# Patient Record
Sex: MALE | Race: WHITE | NOT HISPANIC OR LATINO | Employment: STUDENT | URBAN - METROPOLITAN AREA
[De-identification: names, ages, dates, MRNs, and addresses within clinical notes are randomized per-mention and may not be internally consistent; named-entity substitution may affect disease eponyms.]

---

## 2019-10-10 PROBLEM — K60.2 ANAL FISSURE: Status: ACTIVE | Noted: 2019-10-10

## 2020-02-01 ENCOUNTER — OFFICE VISIT (OUTPATIENT)
Dept: URGENT CARE | Age: 25
End: 2020-02-01
Payer: COMMERCIAL

## 2020-02-01 VITALS
TEMPERATURE: 98 F | SYSTOLIC BLOOD PRESSURE: 108 MMHG | HEART RATE: 76 BPM | HEIGHT: 72 IN | DIASTOLIC BLOOD PRESSURE: 80 MMHG | BODY MASS INDEX: 21.67 KG/M2 | OXYGEN SATURATION: 99 % | RESPIRATION RATE: 18 BRPM | WEIGHT: 160 LBS

## 2020-02-01 DIAGNOSIS — S61.216A: Primary | ICD-10-CM

## 2020-02-01 PROCEDURE — 90715 TDAP VACCINE 7 YRS/> IM: CPT

## 2020-02-01 PROCEDURE — 90471 IMMUNIZATION ADMIN: CPT | Performed by: PHYSICIAN ASSISTANT

## 2020-02-01 PROCEDURE — G0382 LEV 3 HOSP TYPE B ED VISIT: HCPCS | Performed by: PHYSICIAN ASSISTANT

## 2020-02-01 PROCEDURE — 12001 RPR S/N/AX/GEN/TRNK 2.5CM/<: CPT | Performed by: PHYSICIAN ASSISTANT

## 2020-02-02 NOTE — PATIENT INSTRUCTIONS
Keep wound clean dry and covered  Suture removal in 7-10 days  Recheck immediately if you have any redness, swelling, purulent discharge  Change the wound dressing daily and you may wash the wound with soap water and pat dry well afterwards

## 2020-02-02 NOTE — PROGRESS NOTES
[facility]        NAME: Jaimie Yeager is a 25 y o  male  : 1995    MRN: [id number]  DATE: 2020  TIME: 8:27 PM    Assessment and Plan   Laceration of right little finger, initial encounter [S61 216A]  1  Laceration of right little finger, initial encounter  TDAP VACCINE GREATER THAN OR EQUAL TO 6YO IM         Patient Instructions       Follow up with PCP in 3-5 days  Proceed to  ER if symptoms worsen  Chief Complaint     Chief Complaint   Patient presents with    Finger Laceration     left index finger lac         History of Present Illness       Patient is here for laceration of the right little finger  Patient was at home cooking when he sliced his finger with a kitchen knife accidentally  Review of Systems   Review of Systems   Constitutional: Negative  Skin: Positive for wound  Current Medications       Current Outpatient Medications:     NIFEdipine 0 3%-lidocaine 5% rectal ointment, Apply 1 application topically every 4 (four) hours as needed for discomfort or pain Apply a small amount to anal fissure, Disp: 2 oz, Rfl: 0    TRUVADA 200-300 MG per tablet, , Disp: , Rfl:     Current Allergies     Allergies as of 2020    (No Known Allergies)            The following portions of the patient's history were reviewed and updated as appropriate: allergies, current medications, past family history, past medical history, past social history, past surgical history and problem list      Past Medical History:   Diagnosis Date    Ear problems        No past surgical history on file  No family history on file  Medications have been verified  Objective   /80 (BP Location: Right arm, Patient Position: Prone, Cuff Size: Large)   Pulse 76   Temp 98 °F (36 7 °C)   Resp 18   Ht 6' (1 829 m)   Wt 72 6 kg (160 lb)   SpO2 99%   BMI 21 70 kg/m²          Physical Exam     Physical Exam   Constitutional: He is oriented to person, place, and time  He appears well-developed and well-nourished  No distress  HENT:   Head: Normocephalic and atraumatic  Neurological: He is alert and oriented to person, place, and time  Skin: Skin is warm and dry  He is not diaphoretic  1 cm laceration to the right little finger  Psychiatric: He has a normal mood and affect  His behavior is normal  Judgment and thought content normal    Nursing note and vitals reviewed  Laceration repair  Date/Time: 2/1/2020 8:29 PM  Performed by: Davin Jones PA-C  Authorized by: Davin Jones PA-C   Consent: Verbal consent obtained  Risks and benefits: risks, benefits and alternatives were discussed  Consent given by: patient  Patient understanding: patient states understanding of the procedure being performed  Patient identity confirmed: verbally with patient  Body area: upper extremity  Location details: right small finger  Laceration length: 1 cm  Foreign bodies: no foreign bodies  Tendon involvement: none  Nerve involvement: none  Vascular damage: no  Anesthesia: digital block    Anesthesia:  Local Anesthetic: lidocaine 2% without epinephrine  Anesthetic total: 3 mL    Sedation:  Patient sedated: no        Procedure Details:  Preparation: Patient was prepped and draped in the usual sterile fashion    Irrigation solution: tap water  Irrigation method: tap  Amount of cleaning: standard  Degree of undermining: none  Skin closure: 5-0 nylon  Number of sutures: 3  Technique: simple  Approximation: close  Approximation difficulty: simple  Dressing: antibiotic ointment and gauze roll  Patient tolerance: Patient tolerated the procedure well with no immediate complications

## 2020-02-07 ENCOUNTER — OFFICE VISIT (OUTPATIENT)
Dept: URGENT CARE | Age: 25
End: 2020-02-07
Payer: COMMERCIAL

## 2020-02-07 VITALS
WEIGHT: 165 LBS | HEART RATE: 72 BPM | SYSTOLIC BLOOD PRESSURE: 133 MMHG | OXYGEN SATURATION: 100 % | HEIGHT: 72 IN | RESPIRATION RATE: 16 BRPM | DIASTOLIC BLOOD PRESSURE: 81 MMHG | BODY MASS INDEX: 22.35 KG/M2 | TEMPERATURE: 97.9 F

## 2020-02-07 DIAGNOSIS — Z48.02 ENCOUNTER FOR REMOVAL OF SUTURES: Primary | ICD-10-CM

## 2020-02-07 PROCEDURE — G0382 LEV 3 HOSP TYPE B ED VISIT: HCPCS | Performed by: PHYSICIAN ASSISTANT

## 2020-02-07 NOTE — PROGRESS NOTES
[facility]        NAME: Santos Hussein is a 25 y o  male  : 1995    MRN: [id number]  DATE: 2020  TIME: 5:47 PM    Assessment and Plan   Encounter for removal of sutures [Z48 02]  1  Encounter for removal of sutures           Patient Instructions     Keep wound clean dry and intact  Monitor for increasing signs of infection: redness, warmth to touch, fever/chills, nausea/vomiting,  discharge, red streaking  Follow up with PCP in 3-5 days  Proceed to  ER if symptoms worsen  Chief Complaint     Chief Complaint   Patient presents with    Suture / Staple Removal     left 5th digit         History of Present Illness       Right 5 th digit laceration  3 sutures placed 7 days ago  Denies discharge, warmth to touch, fever/chills, nausea, vomiting, chest pain, shortness of breath, numbness, tingling, loss of sensation  Review of Systems   Review of Systems   Constitutional: Negative for chills, fatigue and fever  HENT: Negative for congestion, ear pain, hearing loss, postnasal drip, sinus pressure, sinus pain and sore throat  Eyes: Negative for pain and discharge  Respiratory: Negative for chest tightness and shortness of breath  Cardiovascular: Negative for chest pain  Gastrointestinal: Negative for abdominal pain, constipation, nausea and vomiting  Genitourinary: Negative for difficulty urinating  Musculoskeletal: Negative for arthralgias and myalgias  Skin: Positive for wound  Negative for rash  Neurological: Negative for dizziness and headaches  Psychiatric/Behavioral: Negative for behavioral problems           Current Medications       Current Outpatient Medications:     Emtricitabine-Tenofovir AF (DESCOVY PO), Take by mouth, Disp: , Rfl:     NIFEdipine 0 3%-lidocaine 5% rectal ointment, Apply 1 application topically every 4 (four) hours as needed for discomfort or pain Apply a small amount to anal fissure, Disp: 2 oz, Rfl: 0    TRUVADA 200-300 MG per tablet, , Disp: , Rfl:     Current Allergies     Allergies as of 02/07/2020    (No Known Allergies)            The following portions of the patient's history were reviewed and updated as appropriate: allergies, current medications, past family history, past medical history, past social history, past surgical history and problem list      Past Medical History:   Diagnosis Date    Ear problems        History reviewed  No pertinent surgical history  History reviewed  No pertinent family history  Medications have been verified  Objective   /81   Pulse 72   Temp 97 9 °F (36 6 °C)   Resp 16   Ht 5' 11 5" (1 816 m)   Wt 74 8 kg (165 lb)   SpO2 100%   BMI 22 69 kg/m²        Physical Exam     Physical Exam   Constitutional: He is oriented to person, place, and time  He appears well-developed and well-nourished  HENT:   Right Ear: Tympanic membrane and external ear normal    Left Ear: Tympanic membrane and external ear normal    Neck: Normal range of motion  No edema present  Cardiovascular: Normal rate, regular rhythm, S1 normal, S2 normal and normal heart sounds  No murmur heard  Pulmonary/Chest: Effort normal and breath sounds normal  No respiratory distress  He has no wheezes  He has no rales  He exhibits no tenderness  Lymphadenopathy:     He has no cervical adenopathy  Neurological: He is alert and oriented to person, place, and time  Skin: Skin is warm, dry and intact  No rash noted  Psychiatric: He has a normal mood and affect  His speech is normal and behavior is normal    Nursing note and vitals reviewed

## 2024-02-21 PROBLEM — S61.216A: Status: RESOLVED | Noted: 2020-02-01 | Resolved: 2024-02-21

## 2024-03-11 ENCOUNTER — TELEPHONE (OUTPATIENT)
Dept: PSYCHIATRY | Facility: CLINIC | Age: 29
End: 2024-03-11

## 2024-03-11 NOTE — TELEPHONE ENCOUNTER
Writer rec a call back from pt looking for Family group therapy for 4 potentially 5    Patient has been added to the Talk Therapy wait list without a referral.    Insurance: [employer]  Insurance Type:    Commercial [x]   Medicaid []   [facility] (if applicable)   Medicare []  [facility]  Provider Preference: any  Virtual: Yes [] No [x]  Were outside resources sent: Yes [x] No []

## 2024-03-11 NOTE — TELEPHONE ENCOUNTER
Writer rec a call from pt regarding scheduling and  when asked insurance he stated that that was his dads insurance.  Writer rec pts insurance information to change in account:    [employer]  ID#[id number]  GROUP # [id number]  PHONE- [phone]  Effective sometime around 2022/1/2023    Please take off fathers insurance [employer]

## 2024-09-30 ENCOUNTER — TELEPHONE (OUTPATIENT)
Age: 29
End: 2024-09-30

## 2024-12-24 ENCOUNTER — TELEPHONE (OUTPATIENT)
Age: 29
End: 2024-12-24

## 2024-12-24 NOTE — TELEPHONE ENCOUNTER
"Behavioral Health Outpatient Intake Questions    Referred By   : Self    Please advise interviewee that they need to answer all questions truthfully to allow for best care, and any misrepresentations of information may affect their ability to be seen at this clinic   => Was this discussed? Yes       Behavioral Health Outpatient Intake History -     Presenting Problem (in patient's own words): Family Therapy    Are there any communication barriers for this patient?     No                                                 Are you taking any psychiatric medications? No       Has the Patient previously received outpatient Talk Therapy or Medication Management from [facility]  No         If \"NO\" -Has Patient received these services elsewhere?       If \"YES\" -When, Where, and with Whom? Therapy in [place]    Has the Patient abused alcohol or other substances in the last 6 months ? No  No concerns of substance abuse are reported.    Legal History-     Is this treatment court ordered? No   If \"yes \"send to :  Talk Therapy : Send to [facility] [place] for final determination   Med Management: Send to Dr. Brown for final determination     Has the Patient been convicted of a felony?  No    ACCEPTED as a patient Yes  If \"Yes\" Appointment Date: 02/27/2025 @ 2:00pm with Dalton Lucas Location    Referred Elsewhere? No    Name of Insurance Co:[employer]  Insurance ID#[id number]   Insurance Phone #  If ins is primary or secondary?Primary  If patient is a minor, parents information such as Name, D.O.B of guarantor.  "

## 2024-12-31 ENCOUNTER — TELEPHONE (OUTPATIENT)
Dept: PSYCHIATRY | Facility: CLINIC | Age: 29
End: 2024-12-31

## 2024-12-31 NOTE — TELEPHONE ENCOUNTER
One week follow up call for New Patient appointment with Rafa Swain on 3/27/25  was made on 12/31/24. Writer informed patient of New Patient paperwork needing to be completed 5 days prior to the appointment. Writer confirmed paperwork has been sent via mail.    Appointment was made on: 12/24/24

## 2025-03-27 ENCOUNTER — TELEPHONE (OUTPATIENT)
Age: 30
End: 2025-03-27

## 2025-03-27 NOTE — TELEPHONE ENCOUNTER
Patient is calling regarding cancelling an appointment for New Patient Talk Therapy.    Date/Time: 3/27/25 @2pm in the [place] office    Reason: Patient has a crazy schedule and will call back to reschedule    Patient was rescheduled: YES [] NO [x]  If yes, when was Patient reschedule for: N/A    Patient requesting call back to reschedule: YES [] NO [x]    **Patient stated they will call back to reschedule